# Patient Record
Sex: FEMALE | Race: WHITE
[De-identification: names, ages, dates, MRNs, and addresses within clinical notes are randomized per-mention and may not be internally consistent; named-entity substitution may affect disease eponyms.]

---

## 2019-08-27 ENCOUNTER — HOSPITAL ENCOUNTER (OUTPATIENT)
Dept: HOSPITAL 92 - LABBT | Age: 69
Discharge: HOME | End: 2019-08-27
Attending: UROLOGY
Payer: MEDICARE

## 2019-08-27 DIAGNOSIS — R10.9: ICD-10-CM

## 2019-08-27 DIAGNOSIS — R31.0: ICD-10-CM

## 2019-08-27 DIAGNOSIS — R35.0: ICD-10-CM

## 2019-08-27 DIAGNOSIS — Z01.818: Primary | ICD-10-CM

## 2019-08-27 DIAGNOSIS — R39.15: ICD-10-CM

## 2019-08-27 DIAGNOSIS — R11.0: ICD-10-CM

## 2019-08-27 DIAGNOSIS — N20.1: ICD-10-CM

## 2019-08-27 LAB
ANION GAP SERPL CALC-SCNC: 15 MMOL/L (ref 10–20)
APTT PPP: 30 SEC (ref 22.9–36.1)
BUN SERPL-MCNC: 19 MG/DL (ref 9.8–20.1)
CALCIUM SERPL-MCNC: 10.2 MG/DL (ref 7.8–10.44)
CHLORIDE SERPL-SCNC: 104 MMOL/L (ref 98–107)
CO2 SERPL-SCNC: 24 MMOL/L (ref 23–31)
CREAT CL PREDICTED SERPL C-G-VRATE: 0 ML/MIN (ref 70–130)
GLUCOSE SERPL-MCNC: 90 MG/DL (ref 80–115)
HGB BLD-MCNC: 13.1 G/DL (ref 12–16)
INR PPP: 1
MCH RBC QN AUTO: 31.7 PG (ref 27–31)
MCV RBC AUTO: 94 FL (ref 78–98)
PLATELET # BLD AUTO: 263 THOU/UL (ref 130–400)
POTASSIUM SERPL-SCNC: 4.2 MMOL/L (ref 3.5–5.1)
PROTHROMBIN TIME: 13.7 SEC (ref 12–14.7)
RBC # BLD AUTO: 4.13 MILL/UL (ref 4.2–5.4)
SODIUM SERPL-SCNC: 139 MMOL/L (ref 136–145)
WBC # BLD AUTO: 5.7 THOU/UL (ref 4.8–10.8)

## 2019-08-27 PROCEDURE — 80048 BASIC METABOLIC PNL TOTAL CA: CPT

## 2019-08-27 PROCEDURE — 93010 ELECTROCARDIOGRAM REPORT: CPT

## 2019-08-27 PROCEDURE — 85730 THROMBOPLASTIN TIME PARTIAL: CPT

## 2019-08-27 PROCEDURE — 85027 COMPLETE CBC AUTOMATED: CPT

## 2019-08-27 PROCEDURE — 87086 URINE CULTURE/COLONY COUNT: CPT

## 2019-08-27 PROCEDURE — 93005 ELECTROCARDIOGRAM TRACING: CPT

## 2019-08-27 PROCEDURE — 81001 URINALYSIS AUTO W/SCOPE: CPT

## 2019-08-27 PROCEDURE — 85610 PROTHROMBIN TIME: CPT

## 2019-08-28 ENCOUNTER — HOSPITAL ENCOUNTER (OUTPATIENT)
Dept: HOSPITAL 92 - SDC | Age: 69
Discharge: HOME | End: 2019-08-28
Attending: UROLOGY
Payer: MEDICARE

## 2019-08-28 VITALS — BODY MASS INDEX: 28 KG/M2

## 2019-08-28 DIAGNOSIS — N20.1: Primary | ICD-10-CM

## 2019-08-28 DIAGNOSIS — R11.0: ICD-10-CM

## 2019-08-28 DIAGNOSIS — Z79.1: ICD-10-CM

## 2019-08-28 DIAGNOSIS — Z79.899: ICD-10-CM

## 2019-08-28 DIAGNOSIS — Z79.82: ICD-10-CM

## 2019-08-28 DIAGNOSIS — Z88.1: ICD-10-CM

## 2019-08-28 DIAGNOSIS — Z88.5: ICD-10-CM

## 2019-08-28 DIAGNOSIS — N13.8: ICD-10-CM

## 2019-08-28 DIAGNOSIS — E03.9: ICD-10-CM

## 2019-08-28 DIAGNOSIS — E66.3: ICD-10-CM

## 2019-08-28 PROCEDURE — 0T778DZ DILATION OF LEFT URETER WITH INTRALUMINAL DEVICE, VIA NATURAL OR ARTIFICIAL OPENING ENDOSCOPIC: ICD-10-PCS | Performed by: UROLOGY

## 2019-08-28 PROCEDURE — 74018 RADEX ABDOMEN 1 VIEW: CPT

## 2019-08-28 PROCEDURE — 74420 UROGRAPHY RTRGR +-KUB: CPT

## 2019-08-28 PROCEDURE — 0TF78ZZ FRAGMENTATION IN LEFT URETER, VIA NATURAL OR ARTIFICIAL OPENING ENDOSCOPIC: ICD-10-PCS | Performed by: UROLOGY

## 2019-08-28 PROCEDURE — C1769 GUIDE WIRE: HCPCS

## 2019-08-28 PROCEDURE — C1758 CATHETER, URETERAL: HCPCS

## 2019-08-28 PROCEDURE — 82365 CALCULUS SPECTROSCOPY: CPT

## 2019-08-28 PROCEDURE — 88300 SURGICAL PATH GROSS: CPT

## 2019-08-28 NOTE — RAD
Retrograde pyelogram 4 views:



DATE:

8/28/2019



HISTORY:

69-year-old female with left obstructive uropathy due to the distal left ureteral calculus.



FINDINGS:

 images again demonstrates the calculus in the left hemipelvis corresponding to the distal left 
ureteral calculus. Injection into the left ureter demonstrates a nondilated left ureter. Contrast

material is present in mildly dilated left renal collecting system. Next, a wire is placed into left 
renal lower pole calyx. Next, wire is replaced by double pigtail left ureteral stent.



IMPRESSION:

1. Left obstructive uropathy due to distal left ureteral calculus.

2. Placement of left ureteral stent.



Reported By: Louie Tilley 

Electronically Signed:  8/28/2019 6:11 PM

## 2019-08-28 NOTE — RAD
ABDOMEN ONE VIEW:

8/28/19

 

HISTORY: 

Ureteral stone. Preop.

 

COMPARISON: 

CT 8/25/19.

 

FINDINGS: 

The visualized bowel gas pattern is nonspecific. Projecting over the left pelvis at the expected loca
tion of the distal left ureteral stone on recent CT, and a small smooth, oval, vertically oriented ca
lcification is favored to correlate with the left ureteral stone on recent CT. Unfortunately, it is v
jermaine near and partially overlies other calcifications that represent phleboliths. 

 

IMPRESSION: 

No new abnormalities are demonstrated. 

 

POS: [facility]

## 2019-08-29 NOTE — OP
DATE OF PROCEDURE:  08/28/2019



PRIMARY CARE PHYSICIAN:  Bartolo Gonzales MD



PREOPERATIVE DIAGNOSIS:  A 69-year-old female with a left 6 mm distal ureteral

calculus, with nonprogression



POSTOPERATIVE DIAGNOSIS:  A 69-year-old female with a left 6 mm distal ureteral

calculus



PROCEDURES PERFORMED:  Cystoscopy, left retrograde pyelogram, balloon 
dilatation of

left intramural ureter, rigid ureteroscopy, laser lithotripsy, basket 
extraction of

stone fragments, 6 x 22 double-J ureteral stent placement on dangler, taped to

patient's pubic symphysis. 



ANESTHESIA:  General.



SPECIMENS:  Stone fragments for chemical analysis.



COMPLICATIONS:  None apparent.



DISPOSITION:  To recovery room in stable condition.



INDICATIONS FOR PROCEDURE AND HISTORY:  Ms. Jackson is a 69-year-old female who

presented to the emergency room due to left flank pain.  This pain began about 2
-3

weeks ago and she desired to proceed with ureteroscopy, laser lithotripsy.  CT

demonstrates 4 x 6 mm  distal ureteral calculus.  There is a medial pelvic

phlebolith calcification to the stone outside the ureter.  No other stones are 
seen.

 She presents today for the above procedure.  Risks and complications and

indications reviewed including, but not limited to, bleeding, pain, infection,

injury to adjacent organs, urosepsis, ureteral, renal, bladder injury.  Possible

secondary procedure reviewed. 



DESCRIPTION OF PROCEDURE:  After an informed consent was signed, the patient was

taken to the operating room, placed in a dorsal lithotomy position with the 
genital

area prepped and draped in the usual surgical sterile fashion.  Broad-spectrum

antibiotics were provided.  Bilateral IVAN hose SCDs placed.  A 21-Guinean 
cystoscope

was utilized for cystoscopy, which demonstrated normal bladder mucosa.  The UOs 
were

in normal position.  A left retrograde pyelogram was performed with a 1:1 
diluted

contrast demonstrating a filling defect consistent with a stone.  A 0.035 Sensor

wire was able to be passed without difficulty.  At this time, we dilated the

intramural ureter using [employer] 4-cm 12-Guinean to dilate the 
intramural

ureter.  This was seen on fluoroscopy guidance.  Subsequent to dilation, we were

able to pass a rigid ureteroscope.  I was able to make my way into the 
intramural

ureter with ease.  There was a physiologic narrowing just proximal to the 
intramural

ureter.  With peristalsis, I can see lumen of ureter at this caliber opening 
and closing

consistent with physiologic narrowing not actual stricture.  A 0.035 Sensor 
wire was

passed to the proximal ureter and the scope was gently passed proximal to this 
as

the stone was just beyond this.  Stone was visualized, using 200 micron laser 
fiber,

we laser lithotripsied the stone into multiple fragments.  Basket extraction of

stone fragments were performed uneventfully and atraumatically.  Staging of the

ureteral mucosa demonstrated the area that the stone was obstructing 
demonstrated

bullous edema consistent with obstruction and adherence.  I did not see any 
evidence

of ureteral perforation or injury of concern.  She does have reactive edema

consistent with a stone nidus of the ureteral mucosa.  We rendered this stone 
free,

there were tiny fragments too small to basket more proximally.  She was 
passively

dilated proximal to the stone.  We extracted all stones that were amendable to 
be

extracted.  At this time, a 6 x 22 double-J ureteral stent was passed over the

guidewire with distal tail left in situ and taped to patient's pubic symphysis.
  She

tolerated the procedure well and transported to the recovery room in stable

condition.  She was discharged with a course of ciprofloxacin until followup,

VESIcare 5 mg one p.o. daily, tramadol 50 mg #30, 1-2 p.o. q.6-8 hours p.r.n. 
azo

p.r.n., and stool softener, Colace 100 mg one p.o. b.i.d. p.r.n.  The patient 
will

follow up with me next Thursday for stent pull on dangler as there is endoscopic

clearance of the stone. 







Job ID:  [id number]



MTDD

## 2019-09-04 LAB
AMM URATE MFR STONE: (no result) %
BLD.DRIED MFR STONE: (no result) %
CA BILIRUB MFR STONE: (no result) %
CA CARBONATE MFR STONE: (no result) %
CA H2 PHOS DIHYD MFR STONE: (no result) %
CA PHOS MFR STONE: 3 %
CALCIUM OXALATE DIHYDRATE MFR STONE IR: (no result) %
CELL MATERIAL STONE EST-MCNT: (no result) %
CHOLEST MFR STONE: (no result) %
COM MFR STONE: 97 %
COMMENT: (no result)
CYSTINE MFR STONE: (no result) %
LABORATORY COMMENT REPORT: (no result)
NA URATE MFR STONE IR: (no result) %
NEWBERYITE MFR STONE: (no result) %
SIZE STONE: (no result) MM
TRI-PHOS MFR STONE: (no result) %
TRIAMTERENE MFR STONE: (no result) %
URATE DIHYD MFR STONE: (no result) %
URATE MFR STONE: (no result) %
WT STONE: 12.8 MG

## 2019-12-04 ENCOUNTER — HOSPITAL ENCOUNTER (OUTPATIENT)
Dept: HOSPITAL 92 - BICULT | Age: 69
Discharge: HOME | End: 2019-12-04
Attending: UROLOGY
Payer: MEDICARE

## 2019-12-04 DIAGNOSIS — N20.0: Primary | ICD-10-CM

## 2019-12-04 PROCEDURE — 76770 US EXAM ABDO BACK WALL COMP: CPT

## 2019-12-04 PROCEDURE — 74018 RADEX ABDOMEN 1 VIEW: CPT

## 2019-12-04 NOTE — ULT
BILATERAL RENAL ULTRASOUND:

 

Date:  12/04/19

 

HISTORY:  

Renal calculi. 

 

FINDINGS:

Real-time imaging of the right and left kidneys were performed. The right kidney measures 9.3 cm and 
the left kidney measures 10.2 cm in size. There is minimal dilatation of both collecting systems with
 some slight caliceal prominence on the right. On the left side, there is very minimal dilatation of 
some of the lower pole collecting structures. The left-sided changes are significantly reduced as com
pared to the 08/25/19 study. 

 

The bladder region appears unremarkable. In the region of the left adnexa is a 3.0 x 4.1 cm cyst. In 
reviewing the previous CT examination, a cyst was present at that time. It does not have any internal
 echoes. 

 

IMPRESSION: 

1.  Very minimal dilatation to both collecting systems. The bladder is not significantly distended, b
ut it may account for the very minimal dilatation. The changes on the left are significantly reduced 
as compared to the prior exam. 

 

2.  Stable appearance to a cystic structure in the region of the left adnexa. 

 

 

POS: [facility]

## 2019-12-04 NOTE — RAD
KUB:

 

Date:  12/04/19 

 

HISTORY:  

Renal calculi. 

 

COMPARISON:  

08/28/19. 

 

FINDINGS:

Bowel gas pattern appears nonobstructed. No renal calculi are seen. Calcifications in the pelvis appe
ar to represent phleboliths. One calcification which was seen in the left side of the pelvis on the p
revious exam is no longer present. 

 

IMPRESSION: 

No definitive renal or ureteral calculi. 

 

 

POS: [facility]

## 2019-12-09 ENCOUNTER — HOSPITAL ENCOUNTER (OUTPATIENT)
Dept: HOSPITAL 92 - BICCT | Age: 69
Discharge: HOME | End: 2019-12-09
Attending: UROLOGY
Payer: MEDICARE

## 2019-12-09 DIAGNOSIS — N13.39: ICD-10-CM

## 2019-12-09 DIAGNOSIS — K57.30: ICD-10-CM

## 2019-12-09 DIAGNOSIS — Z87.442: ICD-10-CM

## 2019-12-09 DIAGNOSIS — R35.0: ICD-10-CM

## 2019-12-09 DIAGNOSIS — N20.0: Primary | ICD-10-CM

## 2019-12-09 DIAGNOSIS — N83.8: ICD-10-CM

## 2019-12-09 PROCEDURE — 74176 CT ABD & PELVIS W/O CONTRAST: CPT

## 2019-12-09 NOTE — CT
CT ABDOMEN AND PELVIS WITHOUT CONTRAST USING STONE PROTOCOL:

 

HISTORY: 

Urinary frequency and calculus.  The patient had lithotripsy done.

 

COMPARISON: 

8/25/2019.

 

FINDINGS: 

Absence of oral and IV contrast reduces the sensitivity of the exam, particularly for evaluation of s
olid organs involved.

 

The lung bases are clear.  No calcified gallstones are seen.  No free air or free fluid is noted in t
he abdomen or pelvis.  The small bowel loops are not abnormally dilated.  A normal-appearing appendix
 is seen.  There is colonic diverticulosis.

 

No calculi are seen in the kidneys, ureters, or the urinary bladder.  No hydroureteral nephrosis note
d on either side.  The previously noted left distal ureteric calculus is no longer seen.  

 

There is a 2.8 x 3.5 x 4 cm cyst in the left adnexa, likely of ovarian origin.

 

There are vascular calcifications without evidence of aneurysmal dilatation of the abdominal aorta.  
There are degenerative changes and scoliosis of the spine.

 

IMPRESSION: 

1.  No CT evidence of urinary tract calculi or obstruction.

 

2.  Colonic diverticulosis.

 

3.  Approximately 4 cm left ovarian cystic mass.  This should be evaluated with a pelvic ultrasound.

 

POS: [facility]

## 2020-01-08 ENCOUNTER — HOSPITAL ENCOUNTER (OUTPATIENT)
Dept: HOSPITAL 92 - BICMAMMO | Age: 70
Discharge: HOME | End: 2020-01-08
Attending: FAMILY MEDICINE
Payer: MEDICARE

## 2020-01-08 DIAGNOSIS — Z12.31: Primary | ICD-10-CM

## 2020-01-08 PROCEDURE — 77063 BREAST TOMOSYNTHESIS BI: CPT

## 2020-01-08 PROCEDURE — 77067 SCR MAMMO BI INCL CAD: CPT

## 2020-01-08 NOTE — MMO
Bilateral MAMMO Bilat Screen [facility].

 

CLINICAL HISTORY:

Patient is 69 years old and is seen for screening. The patient has no family

history of breast cancer.  The patient has no personal history of cancer.

 

VIEWS:

The views performed were:  bilateral craniocaudal with tomosynthesis; bilateral

mediolateral oblique with tomosynthesis; and left mediolateral oblique.

 

FILMS COMPARED:

The present examination has been compared to prior imaging studies performed at

[facility] on 11/29/2016, 12/06/2016, 12/15/2017 and 01/07/2019.

 

This study has been interpreted with the assistance of computer-aided detection.

 

MAMMOGRAM FINDINGS:

There are scattered fibroglandular densities.

 

There are stable benign appearing calcifications seen in both breasts.

 

There are no suspicious masses, suspicious calcifications, or new areas of

architectural distortion.

 

IMPRESSION:

THERE IS NO MAMMOGRAPHIC EVIDENCE OF MALIGNANCY.

 

A ROUTINE FOLLOW-UP MAMMOGRAM IN 1 YEAR IS RECOMMENDED.

 

THE RESULTS OF THIS EXAM WERE SENT TO THE PATIENT.

 

ACR BI-RADS Category 2 - Benign finding

 

MAMMOGRAPHY NOTE:

 1. A negative mammogram report should not delay a biopsy if a dominant of

 clinically suspicious mass is present.

 2. Approximately 10% to 15% of breast cancers are not detected by

 mammography.

 3. Adenosis and dense breasts may obscure an underlying neoplasm.

 

 

Reported by: ANGEL RIDER MD

Electonically Signed: [id number]

## 2020-10-19 ENCOUNTER — HOSPITAL ENCOUNTER (OUTPATIENT)
Dept: HOSPITAL 92 - BICCT | Age: 70
Discharge: HOME | End: 2020-10-19
Attending: UROLOGY
Payer: MEDICARE

## 2020-10-19 DIAGNOSIS — N89.8: ICD-10-CM

## 2020-10-19 DIAGNOSIS — N20.1: Primary | ICD-10-CM

## 2020-10-19 PROCEDURE — 74176 CT ABD & PELVIS W/O CONTRAST: CPT

## 2020-10-19 PROCEDURE — 81001 URINALYSIS AUTO W/SCOPE: CPT

## 2020-10-19 PROCEDURE — 87086 URINE CULTURE/COLONY COUNT: CPT

## 2020-10-19 NOTE — CT
CT ABDOMEN AND PELVIS WITHOUT IV CONTRAST:

 

INDICATION: 

Calculus of the ureter.

 

HISTORY: 

History states left urinary calculus with a history of lithotripsy.

 

COMPARISON: 

Comparison is made to a CT abdomen and pelvis of 12/9/2019.  No urinary calculi were identified on th
at study.

 

FINDINGS: 

Lung bases clear.

 

The liver, spleen, and pancreas are unremarkable.  

 

Adrenal glands are normal.  

 

Review of the kidneys shows no evidence of hydronephrosis.  No evidence of urinary tract calculus mike
ntified.  The urinary bladder is contracted and not adequately evaluated.  Ureters are normal caliber
.  

 

Small bowel loops appear unremarkable.  Appendix is identified and appears unremarkable.  Review of t
he colon shows scattered diverticula with diverticulosis of the sigmoid.  The transverse colon to the
 left including splenic flexure and upper left colon is nondistended and not well evaluated.  Mucosal
 lesions are not excluded.  Mural thickening in this region cannot be excluded by CT.

 

Images of the pelvis show an unremarkable-appearing uterus.  There continues to be a low-density cyst
ic mass in the left adnexa which was described on the exam of 12/9/2019.  This cystic mass has enlarg
ed slightly measuring up to 4.3 cm maximal AP dimension in the axial plane today with prior similar m
easurement of approximately 3.5 cm.  Cystic ovarian neoplasm would be the primary concern in a patien
t of this age. 

 

The visualized vertebral bodies maintain height and alignment with degenerative disk changes seen in 
the lumbar spine.

 

IMPRESSION: 

1.  No evidence of urinary tract calculus or obstruction.

 

2.  Cystic mass in the left adnexa again noted consistent with ovarian origin.  Cystic ovarian neopla
sm would be the primary concern..  This has enlarged slightly since 12/9/2019.  Recommend GYN consult
ation if this has not been previously evaluated.

 

POS: [facility]

## 2020-11-09 ENCOUNTER — HOSPITAL ENCOUNTER (OUTPATIENT)
Dept: HOSPITAL 92 - RAD | Age: 70
Discharge: HOME | End: 2020-11-09
Attending: UROLOGY
Payer: MEDICARE

## 2020-11-09 DIAGNOSIS — R31.29: Primary | ICD-10-CM

## 2020-11-09 PROCEDURE — 74410 UROGRAPHY NFS DRIP&/BOLUS: CPT

## 2020-11-09 PROCEDURE — 82565 ASSAY OF CREATININE: CPT

## 2020-11-09 NOTE — RAD
IVP:

 

DATE:  11/09/2020

 

HISTORY:  

70-year-old female with microhematuria. 

 

FINDINGS:

The  film demonstrates no suspicious calcifications. Bilateral asymmetric nephrograms are seen f
ollowing the uncomplicated IV administration of contrast. 

 

The kidneys appear normal in size, shape, position, and orientation. There is normal contrast excreti
on into the pelvicaliceal system, ureters, and urinary bladder. The post-void image demonstrates a sm
all amount of residual contrast in the urinary bladder. 

 

IMPRESSION: 

Unremarkable exam. 

 

 

POS: [facility]

## 2020-11-10 LAB — ESTIMATED GFR-MDRD - POC: 71

## 2021-01-14 ENCOUNTER — HOSPITAL ENCOUNTER (OUTPATIENT)
Dept: HOSPITAL 92 - BICMAMMO | Age: 71
Discharge: HOME | End: 2021-01-14
Attending: FAMILY MEDICINE
Payer: MEDICARE

## 2021-01-14 DIAGNOSIS — Z12.31: Primary | ICD-10-CM

## 2021-01-14 PROCEDURE — 77067 SCR MAMMO BI INCL CAD: CPT

## 2021-01-14 PROCEDURE — 77063 BREAST TOMOSYNTHESIS BI: CPT

## 2021-01-14 NOTE — MMO
Bilateral MAMMO Bilat Screen [facility].

 

CLINICAL HISTORY:

Patient is 70 years old and is seen for screening. The patient has no family

history of breast cancer.  The patient has no personal history of cancer.

 

VIEWS:

The views performed were:  bilateral craniocaudal with tomosynthesis and

bilateral mediolateral oblique with tomosynthesis.

 

FILMS COMPARED:

The present examination has been compared to prior imaging studies performed at

[facility] on 12/06/2016, 12/15/2017, 01/07/2019 and 01/08/2020.

 

This study has been interpreted with the assistance of computer-aided detection.

 

MAMMOGRAM FINDINGS:

There are scattered fibroglandular densities.

 

There are no suspicious masses, suspicious calcifications, or new areas of

architectural distortion.

 

IMPRESSION:

THERE IS NO MAMMOGRAPHIC EVIDENCE OF MALIGNANCY.

 

A ROUTINE FOLLOW-UP MAMMOGRAM IN 1 YEAR IS RECOMMENDED.

 

THE RESULTS OF THIS EXAM WERE SENT TO THE PATIENT.

 

ACR BI-RADS Category 1 - Negative

 

MAMMOGRAPHY NOTE:

 1. A negative mammogram report should not delay a biopsy if a dominant of

 clinically suspicious mass is present.

 2. Approximately 10% to 15% of breast cancers are not detected by

 mammography.

 3. Adenosis and dense breasts may obscure an underlying neoplasm.

 

 

Reported by: CHONG RODRIGUEZ MD

Electonically Signed: [id number]